# Patient Record
Sex: FEMALE | Race: WHITE | NOT HISPANIC OR LATINO | Employment: UNEMPLOYED | URBAN - METROPOLITAN AREA
[De-identification: names, ages, dates, MRNs, and addresses within clinical notes are randomized per-mention and may not be internally consistent; named-entity substitution may affect disease eponyms.]

---

## 2017-07-25 ENCOUNTER — HOSPITAL ENCOUNTER (EMERGENCY)
Facility: MEDICAL CENTER | Age: 57
End: 2017-07-26
Attending: EMERGENCY MEDICINE

## 2017-07-25 DIAGNOSIS — D50.0 IRON DEFICIENCY ANEMIA DUE TO CHRONIC BLOOD LOSS: ICD-10-CM

## 2017-07-25 DIAGNOSIS — R40.4 TRANSIENT ALTERATION OF AWARENESS: ICD-10-CM

## 2017-07-25 DIAGNOSIS — F15.10 METHAMPHETAMINE ABUSE (HCC): ICD-10-CM

## 2017-07-25 LAB
ALBUMIN SERPL BCP-MCNC: 3.1 G/DL (ref 3.2–4.9)
ALBUMIN/GLOB SERPL: 0.8 G/DL
ALP SERPL-CCNC: 88 U/L (ref 30–99)
ALT SERPL-CCNC: 9 U/L (ref 2–50)
AMPHET UR QL SCN: POSITIVE
ANION GAP SERPL CALC-SCNC: 8 MMOL/L (ref 0–11.9)
ANISOCYTOSIS BLD QL SMEAR: ABNORMAL
APPEARANCE UR: CLEAR
AST SERPL-CCNC: 11 U/L (ref 12–45)
BARBITURATES UR QL SCN: NEGATIVE
BASOPHILS # BLD AUTO: 0.9 % (ref 0–1.8)
BASOPHILS # BLD: 0.06 K/UL (ref 0–0.12)
BENZODIAZ UR QL SCN: POSITIVE
BILIRUB SERPL-MCNC: 0.4 MG/DL (ref 0.1–1.5)
BILIRUB UR QL STRIP.AUTO: NEGATIVE
BUN SERPL-MCNC: 18 MG/DL (ref 8–22)
BZE UR QL SCN: NEGATIVE
CALCIUM SERPL-MCNC: 8.8 MG/DL (ref 8.5–10.5)
CANNABINOIDS UR QL SCN: NEGATIVE
CHLORIDE SERPL-SCNC: 107 MMOL/L (ref 96–112)
CO2 SERPL-SCNC: 25 MMOL/L (ref 20–33)
COLOR UR: YELLOW
CREAT SERPL-MCNC: 0.51 MG/DL (ref 0.5–1.4)
CULTURE IF INDICATED INDCX: NO UA CULTURE
EOSINOPHIL # BLD AUTO: 0.05 K/UL (ref 0–0.51)
EOSINOPHIL NFR BLD: 0.8 % (ref 0–6.9)
ERYTHROCYTE [DISTWIDTH] IN BLOOD BY AUTOMATED COUNT: 52 FL (ref 35.9–50)
ETHANOL BLD-MCNC: 0 G/DL
ETHANOL BLD-MCNC: 0.01 G/DL
GFR SERPL CREATININE-BSD FRML MDRD: >60 ML/MIN/1.73 M 2
GLOBULIN SER CALC-MCNC: 4 G/DL (ref 1.9–3.5)
GLUCOSE SERPL-MCNC: 82 MG/DL (ref 65–99)
GLUCOSE UR STRIP.AUTO-MCNC: NEGATIVE MG/DL
HCT VFR BLD AUTO: 29.2 % (ref 37–47)
HGB BLD-MCNC: 8.8 G/DL (ref 12–16)
HYPOCHROMIA BLD QL SMEAR: ABNORMAL
KETONES UR STRIP.AUTO-MCNC: ABNORMAL MG/DL
LEUKOCYTE ESTERASE UR QL STRIP.AUTO: NEGATIVE
LYMPHOCYTES # BLD AUTO: 2.77 K/UL (ref 1–4.8)
LYMPHOCYTES NFR BLD: 42.6 % (ref 22–41)
MANUAL DIFF BLD: NORMAL
MCH RBC QN AUTO: 25.2 PG (ref 27–33)
MCHC RBC AUTO-ENTMCNC: 30.1 G/DL (ref 33.6–35)
MCV RBC AUTO: 83.7 FL (ref 81.4–97.8)
METHADONE UR QL SCN: NEGATIVE
MICRO URNS: ABNORMAL
MONOCYTES # BLD AUTO: 0.17 K/UL (ref 0–0.85)
MONOCYTES NFR BLD AUTO: 2.6 % (ref 0–13.4)
MORPHOLOGY BLD-IMP: NORMAL
NEUTROPHILS # BLD AUTO: 3.45 K/UL (ref 2–7.15)
NEUTROPHILS NFR BLD: 52.2 % (ref 44–72)
NEUTS BAND NFR BLD MANUAL: 0.9 % (ref 0–10)
NITRITE UR QL STRIP.AUTO: NEGATIVE
NRBC # BLD AUTO: 0 K/UL
NRBC BLD AUTO-RTO: 0 /100 WBC
OPIATES UR QL SCN: NEGATIVE
OXYCODONE UR QL SCN: NEGATIVE
PCP UR QL SCN: NEGATIVE
PH UR STRIP.AUTO: 7 [PH]
PLATELET # BLD AUTO: 471 K/UL (ref 164–446)
PLATELET BLD QL SMEAR: NORMAL
PMV BLD AUTO: 8 FL (ref 9–12.9)
POTASSIUM SERPL-SCNC: 3.5 MMOL/L (ref 3.6–5.5)
PROPOXYPH UR QL SCN: NEGATIVE
PROT SERPL-MCNC: 7.1 G/DL (ref 6–8.2)
PROT UR QL STRIP: NEGATIVE MG/DL
RBC # BLD AUTO: 3.49 M/UL (ref 4.2–5.4)
RBC BLD AUTO: PRESENT
RBC UR QL AUTO: NEGATIVE
SODIUM SERPL-SCNC: 140 MMOL/L (ref 135–145)
SP GR UR STRIP.AUTO: 1.03
UROBILINOGEN UR STRIP.AUTO-MCNC: 1 MG/DL
WBC # BLD AUTO: 6.5 K/UL (ref 4.8–10.8)

## 2017-07-25 PROCEDURE — 80053 COMPREHEN METABOLIC PANEL: CPT

## 2017-07-25 PROCEDURE — 99285 EMERGENCY DEPT VISIT HI MDM: CPT

## 2017-07-25 PROCEDURE — 85027 COMPLETE CBC AUTOMATED: CPT

## 2017-07-25 PROCEDURE — 96366 THER/PROPH/DIAG IV INF ADDON: CPT

## 2017-07-25 PROCEDURE — 85007 BL SMEAR W/DIFF WBC COUNT: CPT

## 2017-07-25 PROCEDURE — 700101 HCHG RX REV CODE 250: Performed by: EMERGENCY MEDICINE

## 2017-07-25 PROCEDURE — 96365 THER/PROPH/DIAG IV INF INIT: CPT

## 2017-07-25 PROCEDURE — 81003 URINALYSIS AUTO W/O SCOPE: CPT

## 2017-07-25 PROCEDURE — 80307 DRUG TEST PRSMV CHEM ANLYZR: CPT

## 2017-07-25 RX ADMIN — THIAMINE HYDROCHLORIDE 1000 ML: 100 INJECTION, SOLUTION INTRAMUSCULAR; INTRAVENOUS at 15:05

## 2017-07-25 ASSESSMENT — PAIN SCALES - GENERAL: PAINLEVEL_OUTOF10: 0

## 2017-07-25 NOTE — ED AVS SNAPSHOT
Home Care Instructions                                                                                                                Sunshine Roper   MRN: [id number]    Department:  [facility], Emergency Dept   Date of Visit:  7/25/2017            [facility], Emergency Dept    [address]    [place] [address]    Phone:  [phone]      You were seen by     Toribio Velazquez M.D.      Your Diagnosis Was     Transient alteration of awareness     R40.4       Follow-up Information     1. Schedule an appointment as soon as possible for a visit with [facility].    Contact information    [address]  [place] [address] [phone]        Medication Information     Review all of your home medications and newly ordered medications with your primary doctor and/or pharmacist as soon as possible. Follow medication instructions as directed by your doctor and/or pharmacist.     Please keep your complete medication list with you and share with your physician. Update the information when medications are discontinued, doses are changed, or new medications (including over-the-counter products) are added; and carry medication information at all times in the event of emergency situations.               Medication List      Notice     You have not been prescribed any medications.            Procedures and tests performed during your visit     Procedure/Test Number of Times Performed    CBC WITH DIFFERENTIAL 1    COMP METABOLIC PANEL 1    DIAGNOSTIC ALCOHOL 2    DIFFERENTIAL MANUAL 1    ESTIMATED GFR 1    MORPHOLOGY 1    NURSING COMMUNICATION 1    PERIPHERAL SMEAR REVIEW 1    PLATELET ESTIMATE 1    URINALYSIS CULTURE, IF INDICATED 1    URINE DRUG SCREEN 1        Discharge Instructions       Stimulant Use Disorder-Methamphetamines  Stop abusing methamphetamines. Follow-up at the [facility].    Methamphetamines are one of a group of powerful drugs known as stimulants. Methamphetamine is a  "form of amphetamine. It is rarely used medically but is often misused because of the effects it produces. These effects include:  · A feeling of extreme pleasure (euphoria).  · Alertness.  · High energy.  · Increased sexuality.  Common street names for methamphetamine are meth, crystal, ice, glass, and chalk. This drug can be taken by mouth, smoked, snorted, or dissolved in water and injected.  Stimulants are addictive because they activate regions of the brain that are responsible for producing both the pleasurable sensation of \"reward\" and psychological dependence. Together, these actions account for loss of control and the rapid development of drug dependence. This means the user will become ill without the drug (withdrawal) and will need to keep using it to function.   Stimulant use disorder is use of stimulants that disrupts your daily life. It disrupts relationships with family and friends and how you do your job. Methamphetamine increases blood pressure and heart rate. Use can cause a heart attack or stroke. Methamphetamine can also cause death from irregular heart rate or seizures.  SIGNS AND SYMPTOMS   Signs and symptoms of stimulant use disorder with methamphetamine include:  · Use of methamphetamines in larger amounts or over a longer period than intended.  · Unsuccessful attempts to cut down or control methamphetamine use.  · A lot of time spent obtaining, using, or recovering from the effects of methamphetamines.  · A strong desire or urge to use methamphetamines (craving).  · Continued use of methamphetamines in spite of major problems at work, school, or home because of use.  · Continued use of methamphetamines in spite of relationship problems because of use.  · Giving up or cutting down on important life activities because of use of methamphetamines.  · Use of methamphetamines over and over in situations when it is physically hazardous, such as driving a car.  · Continued use of methamphetamines in " spite of a physical problem that is likely related to use. Physical problems can include:  ¨ Extreme weight loss.  ¨ Malnutrition.  ¨ Jaw clenching.  ¨ Severe dental problems (meth mouth).  ¨ Lung problems (due to smoking).  ¨ Skin sores (due to scratching).  ¨ Infections such as human immunodeficiency virus (HIV) and hepatitis (from injecting methamphetamines).  · Continued use of methamphetamines in spite of a mental problem that is likely related to use. Mental problems can include:  ¨ Memory problems.  ¨ Schizophrenia-like symptoms.  ¨ Depression.  ¨ Bipolar mood swings.  ¨ Violent behavior.  ¨ Anxiety.  ¨ Sleep problems.  · Need to use more and more methamphetamines to get the same effect, or lessened effect over time with use of the same amount (tolerance).  · Having withdrawal symptoms when methamphetamine use is stopped, or using methamphetamines to reduce or avoid withdrawal symptoms. Withdrawal symptoms include:  ¨ Depressed or irritable mood.  ¨ Low energy or restlessness.  ¨ Bad dreams.  ¨ Too little or too much sleep.  ¨ Increased appetite.  DIAGNOSIS  Stimulant use disorder is diagnosed by your health care provider. You may be asked questions about your methamphetamine use and how it affects your life. A physical exam may be done. A drug screen may be ordered. You may be referred to a mental health professional. The diagnosis of stimulant use disorder requires at least two symptoms within 12 months. The type of stimulant use disorder depends on the number of symptoms you have. The type may be:  · Mild. Two or three signs and symptoms.  · Moderate. Four or five signs and symptoms.  · Severe. Six or more signs and symptoms.  TREATMENT   There are two types of treatment:   · Short-term (urgent) medical treatment. This helps to preserve life and prevent or minimize damage from the physical or mental problems related to methamphetamine use.    · Long-term substance abuse treatment. This focuses on recovery  from use disorder. It is provided by mental health professionals who have training in substance use disorders. It is usually a combination of counseling, support groups, and nonaddictive medicines that can reduce cravings or block the effects of methamphetamine.  HOME CARE INSTRUCTIONS   · Take medicines only as directed by your health care provider.  · Identify the people and activities that trigger your methamphetamine use and avoid them.  · Keep all follow-up visits as directed by your health care provider.  · Brush and floss your teeth every day. Do this 2 times a day if you can. Also use an antibacterial mouthwash.  · Avoid sugary drinks.  · Do not smoke.  SEEK MEDICAL CARE IF:  · Your symptoms get worse or you relapse.  · You are not able to take medicines as directed.    SEEK IMMEDIATE MEDICAL CARE IF:   · You have serious thoughts about hurting yourself or others.  · You have a seizure, chest pain, sudden weakness, or loss of speech or vision.  FOR MORE INFORMATION  · National [place] on Drug Abuse: www.drugabuse.gov  · Substance Abuse and Mental Health Services Administration: www.samhsa.gov     This information is not intended to replace advice given to you by your health care provider. Make sure you discuss any questions you have with your health care provider.     Document Released: 08/23/2005 Document Revised: 01/08/2016 Document Reviewed: 12/31/2014  Elsevier Interactive Patient Education ©2016 [employer]            Patient Information     Patient Information    Following emergency treatment: all patient requiring follow-up care must return either to a private physician or a clinic if your condition worsens before you are able to obtain further medical attention, please return to the emergency room.     Billing Information    At [facility], we work to make the billing process streamlined for our patients.  Our Representatives are here to answer any questions you may have regarding your hospital  bill.  If you have insurance coverage and have supplied your insurance information to us, we will submit a claim to your insurer on your behalf.  Should you have any questions regarding your bill, we can be reached online or by phone as follows:  Online: You are able pay your bills online or live chat with our representatives about any billing questions you may have. We are here to help Monday - Friday from 8:00am to 7:30pm and 9:00am - 12:00pm on Saturdays.  Please visit [email or web address]  for more information.   Phone:  [phone] or [phone]    Please note that your emergency physician, surgeon, pathologist, radiologist, anesthesiologist, and other specialists are not employed by [facility] and will therefore bill separately for their services.  Please contact them directly for any questions concerning their bills at the numbers below:     Emergency Physician Services:  [phone]  [facility]:  [phone]  [facility]:  [phone]  [facility]:  [phone]    1. Your final bill may vary from the amount quoted upon discharge if all procedures are not complete at that time, or if your doctor has additional procedures of which we are not aware. You will receive an additional bill if you return to the Emergency Department at [facility] for suture removal regardless of the facility of which the sutures were placed.     2. Please arrange for settlement of this account at the emergency registration.    3. All self-pay accounts are due in full at the time of treatment.  If you are unable to meet this obligation then payment is expected within 4-5 days.     4. If you have had radiology studies (CT, X-ray, Ultrasound, MRI), you have received a preliminary result during your emergency department visit. Please contact the radiology department [phone] to receive a copy of your final result. Please discuss the Final  result with your primary physician or with the follow up physician provided.     Crisis Hotline:  [place] Crisis Hotline:  7-500-BIPDXLK or 1-273.585.4616  [place] Crisis Hotline:    1-135.645.1147 or 595-484-4973         ED Discharge Follow Up Questions    1. In order to provide you with very good care, we would like to follow up with a phone call in the next few days.  May we have your permission to contact you?     YES /  NO    2. What is the best phone number to call you? (       )_____-__________    3. What is the best time to call you?      Morning  /  Afternoon  /  Evening                   Patient Signature:  ____________________________________________________________    Date:  ____________________________________________________________

## 2017-07-25 NOTE — ED NOTES
Pt BIB [facility], found laying on the front steps of some one's house, pt denies ETOH, admits to using meth last night, per EMS pt unable to ambulate on scene, unable to find pt in [facility] based on info pt is providing, pt answering A&O questions appropriately, pt disheveled, NAD, chart up for ERP

## 2017-07-25 NOTE — ED PROVIDER NOTES
ED Provider Note    Scribed for Toribio Velazquez M.D. by Lola Laboy. 7/25/2017  4:16 PM    Primary care provider: No primary care provider on file.  Means of arrival: Ambulance  History obtained from: Patient  History limited by: None    CHIEF COMPLAINT  Chief Complaint   Patient presents with   • Drug Abuse   • Other     difficulty walking        HPI  Sunshine Roper is a 56 y.o. unknown who presents via EMS for drug abuse however patient denies this. Associated symptoms include headache. Per nursing note, EMS reports patient was found intoxicated laying on the front steps of someone's house unable to ambulate. Patient is oriented to person and reports she is able to ambulate. Nursing note reports patient admits to doing meth last night. Denies any injuries, illness, cough, dyspnea, diarrhea, vomiting, nausea, abdominal pain, fever.       REVIEW OF SYSTEMS  Pertinent positives include: drug abuse, headache.  Pertinent negatives include: cough, dyspnea, diarrhea, emesis, nausea, abdominal pain, fever.  10+ systems reviewed and negative.        SOCIAL HISTORY   History   Drug Use   • Yes     Comment: meth        SURGICAL HISTORY  None noted.     CURRENT MEDICATIONS  Current Facility-Administered Medications   Medication Dose Route Frequency Provider Last Rate Last Dose   • er detox iv 1000 mL (D5LR + thiamine 100 mg + folic acid 1 mg + magnesium 1 g) infusion 1,000 mL  1,000 mL Intravenous Once Toribio Velazquez M.D.         No current outpatient prescriptions on file.       ALLERGIES  None noted.     PHYSICAL EXAM  VITAL SIGNS: BP 96/63 mmHg  Pulse 85  Temp(Src) 36.9 °C (98.5 °F)  Resp 16  Ht 1.524 m (5')  Wt 43.092 kg (95 lb)  BMI 18.55 kg/m2  Reviewed and normal    Constitutional: Well developed, somnolent, disheveled.   HENT: Normocephalic, atraumatic, bilateral external ears normal, oropharynx dry, No exudates or erythema.   Eyes: Pupils 3 mm. PERRLA, conjunctiva pale, no scleral icterus.   Cardiovascular:  Regular rate and rhythm. No murmurs, rubs or gallops.   Respiratory: Lungs clear to auscultation bilaterally. No wheezes, rales, or rhonchi.   Gastrointestinal:  Abdomen soft, non-tender, non distended.   Skin: 1-2 cm abrasion to top of right shoulder. No erythema, no rash. No edema.   Genitourinary: No costovertebral angle tenderness.   Neurologic: Alert & oriented to person only, Cranial nerves II-XII are intact, Grasp, biceps, extensor hallucis longus, ankle plantar flexion are 5/5 and symmetric, Sensation is intact to light touch in all 4 limbs.  No focal deficits noted. Face is symmetric.   Psychiatric: Affect normal, Judgment normal, Mood normal.       DIFFERENTIAL DIAGNOSIS:  Methamphetamine or cocaine washout. Alcohol intoxication, sedative overdose.     LABORATORY:  Results for orders placed or performed during the hospital encounter of 07/25/17   DIAGNOSTIC ALCOHOL   Result Value Ref Range    Diagnostic Alcohol 0.01 (H) 0.00 g/dL   URINE DRUG SCREEN   Result Value Ref Range    Amphetamines Urine Positive (A) Negative    Barbiturates Negative Negative    Benzodiazepines Positive (A) Negative    Cocaine Metabolite Negative Negative    Methadone Negative Negative    Opiates Negative Negative    Oxycodone Negative Negative    Phencyclidine -Pcp Negative Negative    Propoxyphene Negative Negative    Cannabinoid Metab Negative Negative   CBC WITH DIFFERENTIAL   Result Value Ref Range    WBC 6.5 4.8 - 10.8 K/uL    RBC 3.49 (L) 4.20 - 5.40 M/uL    Hemoglobin 8.8 (L) 12.0 - 16.0 g/dL    Hematocrit 29.2 (L) 37.0 - 47.0 %    MCV 83.7 81.4 - 97.8 fL    MCH 25.2 (L) 27.0 - 33.0 pg    MCHC 30.1 (L) 33.6 - 35.0 g/dL    RDW 52.0 (H) 35.9 - 50.0 fL    Platelet Count 471 (H) 164 - 446 K/uL    MPV 8.0 (L) 9.0 - 12.9 fL    Nucleated RBC 0.00 /100 WBC    NRBC (Absolute) 0.00 K/uL    Neutrophils-Polys 52.20 44.00 - 72.00 %    Lymphocytes 42.60 (H) 22.00 - 41.00 %    Monocytes 2.60 0.00 - 13.40 %    Eosinophils 0.80 0.00 - 6.90  %    Basophils 0.90 0.00 - 1.80 %    Neutrophils (Absolute) 3.45 2.00 - 7.15 K/uL    Lymphs (Absolute) 2.77 1.00 - 4.80 K/uL    Monos (Absolute) 0.17 0.00 - 0.85 K/uL    Eos (Absolute) 0.05 0.00 - 0.51 K/uL    Baso (Absolute) 0.06 0.00 - 0.12 K/uL    Hypochromia 1+     Anisocytosis 1+    COMP METABOLIC PANEL   Result Value Ref Range    Sodium 140 135 - 145 mmol/L    Potassium 3.5 (L) 3.6 - 5.5 mmol/L    Chloride 107 96 - 112 mmol/L    Co2 25 20 - 33 mmol/L    Anion Gap 8.0 0.0 - 11.9    Glucose 82 65 - 99 mg/dL    Bun 18 8 - 22 mg/dL    Creatinine 0.51 0.50 - 1.40 mg/dL    Calcium 8.8 8.5 - 10.5 mg/dL    AST(SGOT) 11 (L) 12 - 45 U/L    ALT(SGPT) 9 2 - 50 U/L    Alkaline Phosphatase 88 30 - 99 U/L    Total Bilirubin 0.4 0.1 - 1.5 mg/dL    Albumin 3.1 (L) 3.2 - 4.9 g/dL    Total Protein 7.1 6.0 - 8.2 g/dL    Globulin 4.0 (H) 1.9 - 3.5 g/dL    A-G Ratio 0.8 g/dL   DIAGNOSTIC ALCOHOL   Result Value Ref Range    Diagnostic Alcohol 0.00 0.00 g/dL   URINALYSIS CULTURE, IF INDICATED   Result Value Ref Range    Color Yellow     Character Clear     Specific Gravity 1.028 <1.035    Ph 7.0 5.0-8.0    Glucose Negative Negative mg/dL    Ketones Trace (A) Negative mg/dL    Protein Negative Negative mg/dL    Bilirubin Negative Negative    Urobilinogen, Urine 1.0 Negative    Nitrite Negative Negative    Leukocyte Esterase Negative Negative    Occult Blood Negative Negative    Micro Urine Req see below     Culture Indicated No UA Culture       INTERVENTIONS: Indication possible dehydration  Medications   er detox iv 1000 mL (D5LR + thiamine 100 mg + folic acid 1 mg + magnesium 1 g) infusion 1,000 mL (0 mL Intravenous Stopped 7/25/17 1820)     Response: No recurrent syncope or seizure.    COURSE & MEDICAL DECISION MAKING    4:16 PM - Patient seen and examined at bedside. Patient will be treated with er detox IV for her symptoms. Ordered diagnostic alcohol, urine drug screen, CBC with differential, CMP, POC urinalysis to evaluate.      7:00 PM - Patient is benzo positive and has anemia.     7:19 PM - Re-evaluated patient. She confirms having a history of anemia and refuses rectal exam.     This patient presents by ambulance for somnolence without any other complaints. She denies drug use but has methamphetamines and benzodiazepines in her urine. She has anemia but refuses rectal exam and reports having anemia in the past. Patient ate and ambulated here. She was observed until her sedation improved. Her sensation is likely due to benzo use or methamphetamine washout. There is no history or evidence of head injury or sepsis.    PLAN:  Discontinue methamphetamine abuse  Methamphetamine handout given    [facility]  [address]  [place] [address]  [phone]    Schedule an appointment as soon as possible for a visit        CONDITION: Stable.    FINAL IMPRESSION  1. Transient alteration of awareness    2. Methamphetamine abuse    3. Iron deficiency anemia due to chronic blood loss          Electronically signed by: Lola Laboy, 7/25/2017 4:16 PM    The note accurately reflects work and decisions made by me.  Toribio Velazquez  7/25/2017  9:29 PM

## 2017-07-25 NOTE — ED AVS SNAPSHOT
[email or web address] Access Code: [email or web address]  Expires: 8/25/2017 12:22 AM    Your email address is not on file at [facility].  Email Addresses are required for you to sign up for [email or web address], please contact [phone] to verify your personal information and to provide your email address prior to attempting to register for [email or web address][place] [address]    [employer]  A secure, online tool to manage your health information     [facility]’s Iglu.com® is a secure, online tool that connects you to your personalized health information from the privacy of your home -- day or night - making it very easy for you to manage your healthcare. Once the activation process is completed, you can even access your medical information using the Iglu.com jean, which is available for free in the Apple Jean store or Google Play store.     To learn more about [email or web address].com, visit [email or web address]    There are two levels of access available (as shown below):   My Chart Features  [facility] Primary Care Doctor [facility]  Specialists [facility]  Urgent  Care Non-[facility] Primary Care Doctor   Email your healthcare team securely and privately 24/7 X X X    Manage appointments: schedule your next appointment; view details of past/upcoming appointments X      Request prescription refills. X      View recent personal medical records, including lab and immunizations X X X X   View health record, including health history, allergies, medications X X X X   Read reports about your outpatient visits, procedures, consult and ER notes X X X X   See your discharge summary, which is a recap of your hospital and/or ER visit that includes your diagnosis, lab results, and care plan X X  X     How to register for [employer]:  Once your e-mail address has been verified, follow the following steps to sign up for [employer].     1. Go to  [email or web address]  2. Click on the Sign Up Now box, which takes you to the New Member Sign Up page. You will need to  provide the following information:  a. Enter your FlickIM Access Code exactly as it appears at the top of this page. (You will not need to use this code after you’ve completed the sign-up process. If you do not sign up before the expiration date, you must request a new code.)   b. Enter your date of birth.   c. Enter your home email address.   d. Click Submit, and follow the next screen’s instructions.  3. Create a FlickIM ID. This will be your FlickIM login ID and cannot be changed, so think of one that is secure and easy to remember.  4. Create a FlickIM password. You can change your password at any time.  5. Enter your Password Reset Question and Answer. This can be used at a later time if you forget your password.   6. Enter your e-mail address. This allows you to receive e-mail notifications when new information is available in [employer].  7. Click Sign Up. You can now view your health information.    For assistance activating your [employer] account, call [phone]

## 2017-07-26 VITALS
SYSTOLIC BLOOD PRESSURE: 124 MMHG | HEIGHT: 60 IN | WEIGHT: 95 LBS | BODY MASS INDEX: 18.65 KG/M2 | TEMPERATURE: 97.8 F | OXYGEN SATURATION: 94 % | DIASTOLIC BLOOD PRESSURE: 72 MMHG | RESPIRATION RATE: 16 BRPM | HEART RATE: 76 BPM

## 2017-07-26 NOTE — ED NOTES
Pt discharged home . Discharge instructions reviewed with patient, all questions answered.   Pt ambulatory, vital signs stable.       Pt refusing to leave after going over discharge instructions. Pt wanting to stay the night. RN educated pt that they are medically stable and MD placed discharge orders. Pt continues to refuse. Security called to bedside to escort pt out of ED.

## 2017-07-26 NOTE — DISCHARGE PLANNING
Patient seen during rounds,  lying in hospital bed, in and out of sleep while trying to eat a box lunch. Patient reports that her name is Sunshine or Bee Roper. Reports having family in [place] including her brother Van ([phone]). Patient gave permission for her brother to be contacted.     Patient's brother Van confirmed patients name is Sunshine Roper. He states that she has had long term mental health problems for over 20 years including multiple psychiatric hospitalizations. He was unsure of her diagnosis but believes it is bipolar disorder. Van says that he has little to no contact with patient and does not want to be contacted regarding the patient in the future. Van reports he has the same mother as the patient but different fathers. Patients mother  3 years ago. Brother resides in the family house.     Pending discharge ordered.    Светлана Nichols, Ph.D., MSW, LCSW

## 2017-07-26 NOTE — DISCHARGE INSTRUCTIONS
"Stimulant Use Disorder-Methamphetamines  Stop abusing methamphetamines. Follow-up at the [facility].    Methamphetamines are one of a group of powerful drugs known as stimulants. Methamphetamine is a form of amphetamine. It is rarely used medically but is often misused because of the effects it produces. These effects include:  · A feeling of extreme pleasure (euphoria).  · Alertness.  · High energy.  · Increased sexuality.  Common street names for methamphetamine are meth, crystal, ice, glass, and chalk. This drug can be taken by mouth, smoked, snorted, or dissolved in water and injected.  Stimulants are addictive because they activate regions of the brain that are responsible for producing both the pleasurable sensation of \"reward\" and psychological dependence. Together, these actions account for loss of control and the rapid development of drug dependence. This means the user will become ill without the drug (withdrawal) and will need to keep using it to function.   Stimulant use disorder is use of stimulants that disrupts your daily life. It disrupts relationships with family and friends and how you do your job. Methamphetamine increases blood pressure and heart rate. Use can cause a heart attack or stroke. Methamphetamine can also cause death from irregular heart rate or seizures.  SIGNS AND SYMPTOMS   Signs and symptoms of stimulant use disorder with methamphetamine include:  · Use of methamphetamines in larger amounts or over a longer period than intended.  · Unsuccessful attempts to cut down or control methamphetamine use.  · A lot of time spent obtaining, using, or recovering from the effects of methamphetamines.  · A strong desire or urge to use methamphetamines (craving).  · Continued use of methamphetamines in spite of major problems at work, school, or home because of use.  · Continued use of methamphetamines in spite of relationship problems because of use.  · Giving up or cutting down on important " life activities because of use of methamphetamines.  · Use of methamphetamines over and over in situations when it is physically hazardous, such as driving a car.  · Continued use of methamphetamines in spite of a physical problem that is likely related to use. Physical problems can include:  ¨ Extreme weight loss.  ¨ Malnutrition.  ¨ Jaw clenching.  ¨ Severe dental problems (meth mouth).  ¨ Lung problems (due to smoking).  ¨ Skin sores (due to scratching).  ¨ Infections such as human immunodeficiency virus (HIV) and hepatitis (from injecting methamphetamines).  · Continued use of methamphetamines in spite of a mental problem that is likely related to use. Mental problems can include:  ¨ Memory problems.  ¨ Schizophrenia-like symptoms.  ¨ Depression.  ¨ Bipolar mood swings.  ¨ Violent behavior.  ¨ Anxiety.  ¨ Sleep problems.  · Need to use more and more methamphetamines to get the same effect, or lessened effect over time with use of the same amount (tolerance).  · Having withdrawal symptoms when methamphetamine use is stopped, or using methamphetamines to reduce or avoid withdrawal symptoms. Withdrawal symptoms include:  ¨ Depressed or irritable mood.  ¨ Low energy or restlessness.  ¨ Bad dreams.  ¨ Too little or too much sleep.  ¨ Increased appetite.  DIAGNOSIS  Stimulant use disorder is diagnosed by your health care provider. You may be asked questions about your methamphetamine use and how it affects your life. A physical exam may be done. A drug screen may be ordered. You may be referred to a mental health professional. The diagnosis of stimulant use disorder requires at least two symptoms within 12 months. The type of stimulant use disorder depends on the number of symptoms you have. The type may be:  · Mild. Two or three signs and symptoms.  · Moderate. Four or five signs and symptoms.  · Severe. Six or more signs and symptoms.  TREATMENT   There are two types of treatment:   · Short-term (urgent) medical  treatment. This helps to preserve life and prevent or minimize damage from the physical or mental problems related to methamphetamine use.    · Long-term substance abuse treatment. This focuses on recovery from use disorder. It is provided by mental health professionals who have training in substance use disorders. It is usually a combination of counseling, support groups, and nonaddictive medicines that can reduce cravings or block the effects of methamphetamine.  HOME CARE INSTRUCTIONS   · Take medicines only as directed by your health care provider.  · Identify the people and activities that trigger your methamphetamine use and avoid them.  · Keep all follow-up visits as directed by your health care provider.  · Brush and floss your teeth every day. Do this 2 times a day if you can. Also use an antibacterial mouthwash.  · Avoid sugary drinks.  · Do not smoke.  SEEK MEDICAL CARE IF:  · Your symptoms get worse or you relapse.  · You are not able to take medicines as directed.    SEEK IMMEDIATE MEDICAL CARE IF:   · You have serious thoughts about hurting yourself or others.  · You have a seizure, chest pain, sudden weakness, or loss of speech or vision.  FOR MORE INFORMATION  · National [place] on Drug Abuse: www.drugabuse.gov  · Substance Abuse and Mental Health Services Administration: www.samhsa.gov     This information is not intended to replace advice given to you by your health care provider. Make sure you discuss any questions you have with your health care provider.     Document Released: 08/23/2005 Document Revised: 01/08/2016 Document Reviewed: 12/31/2014  [facility] Interactive Patient Education ©2016 [employer]

## 2018-05-21 NOTE — ED AVS SNAPSHOT
7/26/2017    Sunshine PHELAN [id number]    Dear Sunshine:    [facility] wants to ensure your discharge home is safe and you or your loved ones have had all of your questions answered regarding your care after you leave the hospital.    Below is a list of resources and contact information should you have any questions regarding your hospital stay, follow-up instructions, or active medical symptoms.    Questions or Concerns Regarding… Contact   Medical Questions Related to Your Discharge  (7 days a week, 8am-5pm) Contact a Nurse Care Coordinator   [phone]   Medical Questions Not Related to Your Discharge  (24 hours a day / 7 days a week)  Contact the Nurse Health Line   [phone]    Medications or Discharge Instructions Refer to your discharge packet   or contact your [facility] Primary Care Provider   [phone]   Follow-up Appointment(s) Schedule your appointment via [facility]   or contact Scheduling [phone]   Billing Review your statement via [employer]  or contact Billing [phone]   Medical Records Review your records via [employer]   or contact Medical Records [phone]     You may receive a telephone call within two days of discharge. This call is to make certain you understand your discharge instructions and have the opportunity to have any questions answered. You can also easily access your medical information, test results and upcoming appointments via the [employer] free online health management tool. You can learn more and sign up at [place]. For assistance setting up your [employer] account, please call [phone].    Once again, we want to ensure your discharge home is safe and that you have a clear understanding of any next steps in your care. If you have any questions or concerns, please do not hesitate to contact us, we are here for you. Thank you for choosing [facility] for your healthcare needs.    Sincerely,    Your [facility] Healthcare Team          EMT/paramedic